# Patient Record
Sex: FEMALE | Race: BLACK OR AFRICAN AMERICAN | NOT HISPANIC OR LATINO | URBAN - METROPOLITAN AREA
[De-identification: names, ages, dates, MRNs, and addresses within clinical notes are randomized per-mention and may not be internally consistent; named-entity substitution may affect disease eponyms.]

---

## 2017-03-21 ENCOUNTER — RECORDS - HEALTHEAST (OUTPATIENT)
Dept: LAB | Facility: HOSPITAL | Age: 37
End: 2017-03-21

## 2017-03-21 LAB — HBV SURFACE AB SERPL IA-ACNC: NEGATIVE M[IU]/ML

## 2017-05-16 ENCOUNTER — RECORDS - HEALTHEAST (OUTPATIENT)
Dept: ADMINISTRATIVE | Facility: OTHER | Age: 37
End: 2017-05-16

## 2017-05-17 ENCOUNTER — OFFICE VISIT - HEALTHEAST (OUTPATIENT)
Dept: FAMILY MEDICINE | Facility: CLINIC | Age: 37
End: 2017-05-17

## 2017-05-17 DIAGNOSIS — Z22.7 LATENT TUBERCULOSIS: ICD-10-CM

## 2017-05-17 DIAGNOSIS — N97.9 FEMALE INFERTILITY: ICD-10-CM

## 2017-05-17 DIAGNOSIS — Z00.00 HEALTH CARE MAINTENANCE: ICD-10-CM

## 2017-05-17 DIAGNOSIS — D64.9 ANEMIA: ICD-10-CM

## 2017-05-17 DIAGNOSIS — D25.9 UTERINE LEIOMYOMA, UNSPECIFIED LOCATION: ICD-10-CM

## 2017-05-17 LAB
CHOLEST SERPL-MCNC: 152 MG/DL
FASTING STATUS PATIENT QL REPORTED: YES
HDLC SERPL-MCNC: 55 MG/DL
LDLC SERPL CALC-MCNC: 90 MG/DL
TRIGL SERPL-MCNC: 37 MG/DL

## 2017-05-17 ASSESSMENT — MIFFLIN-ST. JEOR: SCORE: 1163.05

## 2017-05-18 ENCOUNTER — COMMUNICATION - HEALTHEAST (OUTPATIENT)
Dept: FAMILY MEDICINE | Facility: CLINIC | Age: 37
End: 2017-05-18

## 2018-06-01 ENCOUNTER — RECORDS - HEALTHEAST (OUTPATIENT)
Dept: ADMINISTRATIVE | Facility: OTHER | Age: 38
End: 2018-06-01

## 2021-05-31 VITALS — HEIGHT: 61 IN | BODY MASS INDEX: 22.92 KG/M2 | WEIGHT: 121.4 LBS

## 2021-06-10 NOTE — PROGRESS NOTES
Assessment/Plan:     1. Health care maintenance  Age appropriate health care screening and guidance discussed including nutrition, exercise, immunization updates and vitamin supplementation.   Screening labs and exams ordered below.   Patient had Pap smear yesterday at gynecologist.  Records requested  - Lipid [place] FASTING  - Comprehensive Metabolic Panel  - HM2(CBC w/o Differential)    2. Uterine leiomyoma, unspecified location  3. Female infertility  Continue to follow with gynecology    4. Latent tuberculosis  Is up-to-date on chest x-ray screening.  Treated 2005 and completed 2 years of medications.    Subjective:      Haven Marks is a 37 y.o. female is in today as a new patient to establish care and for annual physical.  She feels well overall.  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  She just started this job after moving from [place] due to a divorce.  She has a boyfriend in the area.  She has been struggling with infertility for several years has had full workup.  She just had an appointment with gynecologist yesterday and was told that she had multiple fibroids.  There are no plans for removal at this point.  She had Pap smear yesterday with gynecologist also states she had breast exam yesterday with gynecologist subject declines this screening tests are needed at this time.  Otherwise she feels well no chest pain shortness of breath palpitations or coughing.  No changes in bowel or bladder.  Her menstruation is pretty regular.  She states that she has had blood work and hormone testing done in the past but she is fasting for cholesterol and glucose today.  She states that she does have very heavy periods and so she states she has had to take iron from time to time and is interested in getting her CBC checked.  States she had mild high cholesterol in the past but changed her diet and it went down.  She also has a history of latent  "tuberculosis which was found in a nodule on her right chest.  This was removed in 2005 and she completed 2 years of treatment.  She follows with chest x-rays and states she recently had one.  She believes her vaccines are up-to-date.  She has no other significant concerns.  Reviewed and updated her past medical surgical social family history.  She has never had a mammogram.  No changes or concerns in the breasts.  Had breast exam yesterday with gynecology.    Current Outpatient Prescriptions   Medication Sig Dispense Refill     NAPROXEN (NAPROSYN ORAL) Take by mouth.       No current facility-administered medications for this visit.        Past Medical History, Family History, and Social History reviewed.  History reviewed. No pertinent past medical history.  Past Surgical History:   Procedure Laterality Date     EXPLORATORY LAPAROTOMY  2011     tuberculosis nodule right chest removal  2005     Review of patient's allergies indicates no known allergies.  Family History   Problem Relation Age of Onset     No Medical Problems Mother      Social History     Social History     Marital status:      Spouse name: N/A     Number of children: N/A     Years of education: N/A     Occupational History     Not on file.     Social History Main Topics     Smoking status: Never Smoker     Smokeless tobacco: Not on file     Alcohol use No     Drug use: No     Sexual activity: Not on file     Other Topics Concern     Not on file     Social History Narrative     No narrative on file         Review of systems is as stated in HPI, and the remainder of the 10 system review is otherwise negative.    Objective:     Vitals:    05/17/17 0905   BP: 94/60   Patient Site: Left Arm   Patient Position: Sitting   Cuff Size: Adult Regular   Pulse: 60   Weight: 121 lb 6.4 oz (55.1 kg)   Height: 5' 1\" (1.549 m)    Body mass index is 22.94 kg/(m^2).    General Appearance:    Alert, cooperative, no distress, appears stated age   Head:    " Normocephalic, without obvious abnormality, atraumatic   Eyes:    PERRL, EOM's intact   Ears:    Normal TM's and external ear canals   Nose:   Mucosa normal, no drainage     or sinus tenderness   Throat:   Oropharynx is clear   Neck:   Supple, symmetrical, no adenopathy, no thyromegally       Lungs:     Clear to auscultation bilaterally, respirations unlabored   Chest Wall:    No tenderness or deformity    Heart:    Regular rate and rhythm, S1 and S2 normal, no murmur, rub    or gallop       Abdomen:     Soft, non-tender, bowel sounds active all four quadrants,     no masses, no organomegaly           Extremities:   Extremities normal, atraumatic, no cyanosis or edema   Pulses:   2+ and symmetric all extremities   Skin:   No rashes or lesions         This note has been dictated using voice recognition software. Any grammatical or context distortions are unintentional and inherent to the the software.

## 2021-06-15 PROBLEM — Z22.7 LATENT TUBERCULOSIS: Status: ACTIVE | Noted: 2017-05-17

## 2021-06-15 PROBLEM — N97.9 FEMALE INFERTILITY: Status: ACTIVE | Noted: 2017-05-17

## 2021-06-15 PROBLEM — D25.9 UTERINE FIBROID: Status: ACTIVE | Noted: 2017-05-17

## 2021-06-15 PROBLEM — D64.9 ANEMIA: Status: ACTIVE | Noted: 2017-05-17

## 2021-07-03 NOTE — ADDENDUM NOTE
Addendum Note by Angeles Recinos DO at 5/17/2017 10:05 AM     Author: Angeles Recinos DO Service: -- Author Type: Physician    Filed: 5/17/2017 10:05 AM Encounter Date: 5/17/2017 Status: Signed    : Angeles Recinos DO (Physician)    Addended by: ANGELES RECINOS on: 5/17/2017 10:05 AM        Modules accepted: Orders